# Patient Record
Sex: FEMALE | Race: OTHER | HISPANIC OR LATINO | Employment: UNEMPLOYED | URBAN - METROPOLITAN AREA
[De-identification: names, ages, dates, MRNs, and addresses within clinical notes are randomized per-mention and may not be internally consistent; named-entity substitution may affect disease eponyms.]

---

## 2020-11-10 ENCOUNTER — OFFICE VISIT (OUTPATIENT)
Dept: UROLOGY | Facility: CLINIC | Age: 45
End: 2020-11-10
Payer: COMMERCIAL

## 2020-11-10 VITALS — WEIGHT: 160.25 LBS | SYSTOLIC BLOOD PRESSURE: 180 MMHG | TEMPERATURE: 97 F | DIASTOLIC BLOOD PRESSURE: 90 MMHG

## 2020-11-10 DIAGNOSIS — R35.0 URINARY FREQUENCY: Primary | ICD-10-CM

## 2020-11-10 DIAGNOSIS — R33.9 URINARY RETENTION: ICD-10-CM

## 2020-11-10 DIAGNOSIS — N81.11 CYSTOCELE, MIDLINE: ICD-10-CM

## 2020-11-10 LAB
BILIRUB SERPL-MCNC: ABNORMAL MG/DL
BLOOD URINE, POC: 250
CLARITY, POC UA: CLEAR
COLOR, POC UA: YELLOW
GLUCOSE UR QL STRIP: ABNORMAL
KETONES UR QL STRIP: ABNORMAL
LEUKOCYTE ESTERASE URINE, POC: ABNORMAL
NITRITE, POC UA: ABNORMAL
PH, POC UA: 5
POC RESIDUAL URINE VOLUME: 151 ML (ref 0–100)
PROTEIN, POC: ABNORMAL
SPECIFIC GRAVITY, POC UA: 1.02
UROBILINOGEN, POC UA: ABNORMAL

## 2020-11-10 PROCEDURE — 1126F PR PAIN SEVERITY QUANTIFIED, NO PAIN PRESENT: ICD-10-PCS | Mod: S$GLB,,, | Performed by: UROLOGY

## 2020-11-10 PROCEDURE — 81002 URINALYSIS NONAUTO W/O SCOPE: CPT | Mod: S$GLB,,, | Performed by: UROLOGY

## 2020-11-10 PROCEDURE — 51798 US URINE CAPACITY MEASURE: CPT | Mod: S$GLB,,, | Performed by: UROLOGY

## 2020-11-10 PROCEDURE — 99204 OFFICE O/P NEW MOD 45 MIN: CPT | Mod: 25,S$GLB,, | Performed by: UROLOGY

## 2020-11-10 PROCEDURE — 99204 PR OFFICE/OUTPT VISIT, NEW, LEVL IV, 45-59 MIN: ICD-10-PCS | Mod: 25,S$GLB,, | Performed by: UROLOGY

## 2020-11-10 PROCEDURE — 51701 INSERT BLADDER CATHETER: CPT | Mod: S$GLB,,, | Performed by: UROLOGY

## 2020-11-10 PROCEDURE — 1126F AMNT PAIN NOTED NONE PRSNT: CPT | Mod: S$GLB,,, | Performed by: UROLOGY

## 2020-11-10 PROCEDURE — 51798 POCT BLADDER SCAN: ICD-10-PCS | Mod: S$GLB,,, | Performed by: UROLOGY

## 2020-11-10 PROCEDURE — 51701 PR INSERTION OF NON-INDWELLING BLADDER CATHETERIZATION FOR RESIDUAL UR: ICD-10-PCS | Mod: S$GLB,,, | Performed by: UROLOGY

## 2020-11-10 PROCEDURE — 81002 POCT URINE DIPSTICK WITHOUT MICROSCOPE: ICD-10-PCS | Mod: S$GLB,,, | Performed by: UROLOGY

## 2020-11-10 RX ORDER — TAMSULOSIN HYDROCHLORIDE 0.4 MG/1
0.4 CAPSULE ORAL DAILY
Qty: 30 CAPSULE | Refills: 0 | Status: SHIPPED | OUTPATIENT
Start: 2020-11-10 | End: 2021-01-05 | Stop reason: SDUPTHER

## 2020-11-10 RX ORDER — HYDROCHLOROTHIAZIDE 25 MG/1
25 TABLET ORAL DAILY
COMMUNITY
Start: 2020-08-26

## 2020-11-10 RX ORDER — PREDNISONE 20 MG/1
20 TABLET ORAL DAILY
COMMUNITY
Start: 2020-09-01 | End: 2020-11-10

## 2020-11-10 NOTE — PROGRESS NOTES
"  Chief Complaint   Patient presents with    Urinary Frequency       Referring Provider: Aaareferral Self      History of Present Illness:   Justa Garcia is a 45 y.o. female here for evaluation of Urinary Frequency    She reports that she "lives" in the bathroom. She has frequency. Sometimes there is dysuria. Sometimes she feels like she really needs to urinate, but only a little will come out. Sometimes she presses on her left side. She went to a clinic in [place] and she was treated with antibiotics. She went back after a week and was treated with more antibiotics.   Symptoms have been ongoing for 3 months.   She reports that her menstrual cycle has been irregular for the past year and she may be going through menopause.   She has been taking OTC cranberry supplement.   She had a recent pap smear at [place] clinic recently.   She reports minor constipation.   She reports that she tends to have white coat hypertension.         Review of Systems   Constitutional: Negative for appetite change, chills, fever and unexpected weight change.   HENT: Negative for drooling, ear pain, facial swelling, mouth sores and nosebleeds.    Eyes: Negative for photophobia, pain and visual disturbance.   Respiratory: Negative for apnea, choking and chest tightness.    Cardiovascular: Negative for chest pain, palpitations and leg swelling.   Gastrointestinal: Negative for anal bleeding, constipation, diarrhea, nausea and vomiting.   Genitourinary: Positive for frequency and vaginal bleeding. Negative for genital sores and vaginal discharge.   Musculoskeletal: Negative for gait problem, joint swelling and myalgias.   Skin: Negative for color change, rash and wound.   Neurological: Negative for dizziness, tremors, seizures and syncope.   Hematological: Negative for adenopathy. Does not bruise/bleed easily.   Psychiatric/Behavioral: Negative for confusion, hallucinations and self-injury.         Past Medical History: "   Diagnosis Date    HTN (hypertension)        Past Surgical History:   Procedure Laterality Date     SECTION         Family History   Problem Relation Age of Onset    Hypertension Father        Social History     Tobacco Use    Smoking status: Never Smoker    Smokeless tobacco: Never Used   Substance Use Topics    Alcohol use: Never     Frequency: Never    Drug use: Never       Current Outpatient Medications   Medication Sig Dispense Refill    hydroCHLOROthiazide (HYDRODIURIL) 25 MG tablet Take 25 mg by mouth once daily.      tamsulosin (FLOMAX) 0.4 mg Cap Take 1 capsule (0.4 mg total) by mouth once daily. 30 capsule 0     No current facility-administered medications for this visit.        Review of patient's allergies indicates:  No Known Allergies    Physical Exam  Vitals:    11/10/20 1603   BP: (!) 180/90   Temp: 97.2 °F (36.2 °C)     General: Well-developed, well-nourished, in no acute distress  HEENT: Normocephalic, atraumatic, extraocular movements intact  Neck: Supple, no supraclavicular or cervical lymphadenopathy, trachea midline  Respirations: even and unlabored  Back: midline spine, No CVA tenderness  Abdomen: soft, Non-tender, non-distended, no palpable masses, no rebound or guarding  : Normal external female genitalia without lesions. Orthotopic urethral meatus. Grade 2 Cysotcele, Grade 1 Rectocele, Grade 1 apical prolapse, negative cough stress test, negative urethral hypermobility, in and out catheterization performed under sterile conditions and drained 200cc from the bladder (post void)  Extremities: moves all equally, no clubbing, cyanosis or edema  Skin: Warm and dry. No lesions  Psych: normal affect  Neuro: Alert and oriented x 3. Cranial nerves II-XII intact      Urinalysis  pH, UA   Date Value Ref Range Status   11/10/2020 5  Final     Nitrite, UA   Date Value Ref Range Status   11/10/2020 n  Final           Assessment:  1. Urinary frequency  POCT URINE DIPSTICK WITHOUT  MICROSCOPE    POCT Bladder Scan   2. Urinary retention     3. Cystocele, midline           Plan:   Urinary frequency  -     POCT URINE DIPSTICK WITHOUT MICROSCOPE  -     POCT Bladder Scan    Urinary retention    Cystocele, midline    Other orders  -     tamsulosin (FLOMAX) 0.4 mg Cap; Take 1 capsule (0.4 mg total) by mouth once daily.  Dispense: 30 capsule; Refill: 0    Discussed possible causes of retention with the patient and her . Will try flomax, but if not improved, will get urodynamics.       Follow up in about 2 weeks (around 11/24/2020).

## 2020-11-20 ENCOUNTER — TELEPHONE (OUTPATIENT)
Dept: UROLOGY | Facility: CLINIC | Age: 45
End: 2020-11-20

## 2020-11-20 NOTE — TELEPHONE ENCOUNTER
Called and spoke with .  States his wife saw Dr Mesa last week and was not emptying her bladder like she should. She was started on some medication to help her void. He states that when she woke up this morning she was not able to void.  He said that she finally voided a little.  He was wanting to see if she could come in to see Dr Mesa this afternoon.  I explained that Dr Mesa was in surgery today and that I could schedule her to see Dr Hall this afternoon.  He states she is shopping right now and that they would just wait until their appointment for Tuesday.  I told him if it go to where she was having a lot of pain or couldn't void to make sure she goes to the ER.  He voices understanding

## 2020-11-20 NOTE — TELEPHONE ENCOUNTER
----- Message from Leandra Greer sent at 11/20/2020  9:16 AM CST -----  Regarding: Same Day Apppt Request  Same Day Appt Request:    Pt's  Mr Kilo Garcia called to request a work in appt today for the pt due to the pt being unable to urinate and would like a call back this morning asa.    Mr Garcia can be reached at [phone]

## 2020-11-24 ENCOUNTER — OFFICE VISIT (OUTPATIENT)
Dept: UROLOGY | Facility: CLINIC | Age: 45
End: 2020-11-24
Payer: COMMERCIAL

## 2020-11-24 VITALS — DIASTOLIC BLOOD PRESSURE: 90 MMHG | TEMPERATURE: 98 F | WEIGHT: 162.06 LBS | SYSTOLIC BLOOD PRESSURE: 176 MMHG

## 2020-11-24 DIAGNOSIS — R33.9 URINARY RETENTION: ICD-10-CM

## 2020-11-24 DIAGNOSIS — R10.32 LLQ ABDOMINAL PAIN: Primary | ICD-10-CM

## 2020-11-24 DIAGNOSIS — R35.0 URINARY FREQUENCY: ICD-10-CM

## 2020-11-24 LAB — POC RESIDUAL URINE VOLUME: 71 ML (ref 0–100)

## 2020-11-24 PROCEDURE — 99999 PR PBB SHADOW E&M-EST. PATIENT-LVL III: CPT | Mod: PBBFAC,,, | Performed by: UROLOGY

## 2020-11-24 PROCEDURE — 1126F PR PAIN SEVERITY QUANTIFIED, NO PAIN PRESENT: ICD-10-PCS | Mod: S$GLB,,, | Performed by: UROLOGY

## 2020-11-24 PROCEDURE — 51798 US URINE CAPACITY MEASURE: CPT | Mod: S$GLB,,, | Performed by: UROLOGY

## 2020-11-24 PROCEDURE — 99214 OFFICE O/P EST MOD 30 MIN: CPT | Mod: S$GLB,,, | Performed by: UROLOGY

## 2020-11-24 PROCEDURE — 1126F AMNT PAIN NOTED NONE PRSNT: CPT | Mod: S$GLB,,, | Performed by: UROLOGY

## 2020-11-24 PROCEDURE — 99214 PR OFFICE/OUTPT VISIT, EST, LEVL IV, 30-39 MIN: ICD-10-PCS | Mod: S$GLB,,, | Performed by: UROLOGY

## 2020-11-24 PROCEDURE — 51798 POCT BLADDER SCAN: ICD-10-PCS | Mod: S$GLB,,, | Performed by: UROLOGY

## 2020-11-24 PROCEDURE — 99999 PR PBB SHADOW E&M-EST. PATIENT-LVL III: ICD-10-PCS | Mod: PBBFAC,,, | Performed by: UROLOGY

## 2020-11-24 NOTE — PROGRESS NOTES
Chief Complaint   Patient presents with    Follow-up     pt states still the same with urgency, burning after expelling and states some L abdominal pressure         History of Present Illness:   Justa Garcia is a 45 y.o. female here for follow up. At her last visit, she was found to have urinary retention and was started on flomax. Since that time, she reports that she is minimally improved.  She reports that she feels pain/pressure in her LLQ and suprapubic region. She is having a little EBONI as well at times. Sometimes it eases off, sometimes worsens. Nighttime is a little better since starting the flomax.       Review of Systems:   Constitutional: Pt denies fever, chills, change in appetite or unintentional weight loss  HENT: No drooling, loss of hearing, mouth sores, facial swelling  Eyes: No photophobia, visual disturbance or eye pain  Respiratory: No cough, dyspnea, wheezing  Cardiovascular: No chest pain, palpitations or leg swelling  GI: No constipation, diarrhea, nausea, vomiting, melena or hematochezia  : No genital lesions, discharge, nocturnal enuresis  Endocrine: No polydipsia, polyphagia, heat or cold intolerance  Musculoskeletal: No joint swelling, back pain or bone pain  Integument: No color change, rash or wounds  Neurological: No seizures, speech difficulty or tremors  Psychiatric: No confusion, self-harm or Hallucinations    Current Outpatient Medications   Medication Sig Dispense Refill    hydroCHLOROthiazide (HYDRODIURIL) 25 MG tablet Take 25 mg by mouth once daily.      tamsulosin (FLOMAX) 0.4 mg Cap Take 1 capsule (0.4 mg total) by mouth once daily. 30 capsule 0     No current facility-administered medications for this visit.        Review of patient's allergies indicates:  No Known Allergies    Past medical, family, and social history reviewed as documented in chart with pertinent positive medical, family, and social history detailed in HPI.    Physical Exam  Vitals:    11/24/20 1635    BP: (!) 176/90   Temp: 98 °F (36.7 °C)       General: Well-developed, Well Nourished in No acute distress  HEENT: Normocephalic, Atraumatic, Extraocular Movements intact  Neck: Supple, trachea midline, no cervical or supraclavicular lymphadenopathy  Respirations: Even and unlabored  Back: Midline spine without tenderness, no CVA tenderness  Abdomen: Soft, tender in LLQ, non-distended, no hepatosplenomegaly, no rebound or guarding, no palpable masses  Extremities: Moves all equally, atraumatic, no clubbing, cyanosis or edema  Skin: warm and dry  Psych: normal affect  Neuro: Alert and oriented x 3, Cranial nerves II-XII grossly intact      Urinalysis  Color, UA   Date Value Ref Range Status   11/10/2020 Yellow  Final     pH, UA   Date Value Ref Range Status   11/10/2020 5  Final     Spec Grav UA   Date Value Ref Range Status   11/10/2020 1.020  Final     Nitrite, UA   Date Value Ref Range Status   11/10/2020 n  Final     PVR: 71cc    Assesment:   1. LLQ abdominal pain  CT Abdomen Pelvis With Contrast    BUN    Creatinine, Serum   2. Urinary retention  POCT Bladder Scan   3. Urinary frequency           Plan:  LLQ abdominal pain  -     CT Abdomen Pelvis With Contrast; Future; Expected date: 11/24/2020  -     BUN; Future; Expected date: 11/24/2020  -     Creatinine, Serum; Future; Expected date: 11/24/2020    Urinary retention  -     POCT Bladder Scan    Urinary frequency    continue flomax    Follow up for Urodynamics.

## 2020-11-25 ENCOUNTER — TELEPHONE (OUTPATIENT)
Dept: UROLOGY | Facility: CLINIC | Age: 45
End: 2020-11-25

## 2020-11-25 NOTE — TELEPHONE ENCOUNTER
Called and spoke with .  I informed him of this wife's date and time of the procedure.  Voices understanding and will let his wife know

## 2020-12-01 ENCOUNTER — TELEPHONE (OUTPATIENT)
Dept: RADIOLOGY | Facility: HOSPITAL | Age: 45
End: 2020-12-01

## 2020-12-01 ENCOUNTER — TELEPHONE (OUTPATIENT)
Dept: UROLOGY | Facility: CLINIC | Age: 45
End: 2020-12-01

## 2020-12-01 NOTE — TELEPHONE ENCOUNTER
Yes, it's medically necessary. Her condition could definitely worsen if her pain is not properly evaluated and she could develop irreversible damage.

## 2020-12-02 ENCOUNTER — HOSPITAL ENCOUNTER (OUTPATIENT)
Dept: RADIOLOGY | Facility: HOSPITAL | Age: 45
Discharge: HOME | End: 2020-12-02
Attending: UROLOGY
Payer: COMMERCIAL

## 2020-12-02 ENCOUNTER — OFFICE VISIT (OUTPATIENT)
Dept: UROLOGY | Facility: CLINIC | Age: 45
End: 2020-12-02
Payer: COMMERCIAL

## 2020-12-02 VITALS
BODY MASS INDEX: 26.05 KG/M2 | HEART RATE: 105 BPM | DIASTOLIC BLOOD PRESSURE: 86 MMHG | HEIGHT: 66 IN | SYSTOLIC BLOOD PRESSURE: 148 MMHG | WEIGHT: 162.06 LBS

## 2020-12-02 DIAGNOSIS — N39.3 SUI (STRESS URINARY INCONTINENCE, FEMALE): ICD-10-CM

## 2020-12-02 DIAGNOSIS — K76.9 LIVER DISEASE, UNSPECIFIED: ICD-10-CM

## 2020-12-02 DIAGNOSIS — R10.32 LLQ ABDOMINAL PAIN: ICD-10-CM

## 2020-12-02 DIAGNOSIS — R33.9 URINARY RETENTION: Primary | ICD-10-CM

## 2020-12-02 DIAGNOSIS — R93.5 ABNORMAL CT SCAN, PELVIS: ICD-10-CM

## 2020-12-02 DIAGNOSIS — N32.81 DETRUSOR INSTABILITY: ICD-10-CM

## 2020-12-02 DIAGNOSIS — K76.89 LIVER CYST: ICD-10-CM

## 2020-12-02 PROCEDURE — 51797 INTRAABDOMINAL PRESSURE TEST: CPT | Mod: 26,S$GLB,, | Performed by: UROLOGY

## 2020-12-02 PROCEDURE — 3008F PR BODY MASS INDEX (BMI) DOCUMENTED: ICD-10-PCS | Mod: CPTII,S$GLB,, | Performed by: UROLOGY

## 2020-12-02 PROCEDURE — 99499 NO LOS: ICD-10-PCS | Mod: S$GLB,,, | Performed by: UROLOGY

## 2020-12-02 PROCEDURE — 99999 PR PBB SHADOW E&M-EST. PATIENT-LVL IV: ICD-10-PCS | Mod: PBBFAC,,, | Performed by: UROLOGY

## 2020-12-02 PROCEDURE — 81002 URINALYSIS NONAUTO W/O SCOPE: CPT | Mod: S$GLB,,, | Performed by: UROLOGY

## 2020-12-02 PROCEDURE — 74177 CT ABDOMEN PELVIS WITH CONTRAST: ICD-10-PCS | Mod: 26,,, | Performed by: RADIOLOGY

## 2020-12-02 PROCEDURE — 51728 PR COMPLEX CYSTOMETROGRAM VOIDING PRESSURE STUDIES: ICD-10-PCS | Mod: 26,S$GLB,, | Performed by: UROLOGY

## 2020-12-02 PROCEDURE — 81002 POCT URINE DIPSTICK WITHOUT MICROSCOPE: ICD-10-PCS | Mod: S$GLB,,, | Performed by: UROLOGY

## 2020-12-02 PROCEDURE — 51728 CYSTOMETROGRAM W/VP: CPT | Mod: 26,S$GLB,, | Performed by: UROLOGY

## 2020-12-02 PROCEDURE — 74177 CT ABD & PELVIS W/CONTRAST: CPT | Mod: 26,,, | Performed by: RADIOLOGY

## 2020-12-02 PROCEDURE — 74177 CT ABD & PELVIS W/CONTRAST: CPT | Mod: TC

## 2020-12-02 PROCEDURE — 51784 PR ANAL/URINARY MUSCLE STUDY: ICD-10-PCS | Mod: 26,51,S$GLB, | Performed by: UROLOGY

## 2020-12-02 PROCEDURE — 51797 PR VOIDING PRESS STUDY INTRA-ABDOMINAL VOID: ICD-10-PCS | Mod: 26,S$GLB,, | Performed by: UROLOGY

## 2020-12-02 PROCEDURE — 99999 PR PBB SHADOW E&M-EST. PATIENT-LVL IV: CPT | Mod: PBBFAC,,, | Performed by: UROLOGY

## 2020-12-02 PROCEDURE — 3008F BODY MASS INDEX DOCD: CPT | Mod: CPTII,S$GLB,, | Performed by: UROLOGY

## 2020-12-02 PROCEDURE — 1126F PR PAIN SEVERITY QUANTIFIED, NO PAIN PRESENT: ICD-10-PCS | Mod: S$GLB,,, | Performed by: UROLOGY

## 2020-12-02 PROCEDURE — 25500020 PHARM REV CODE 255: Performed by: UROLOGY

## 2020-12-02 PROCEDURE — 51784 ANAL/URINARY MUSCLE STUDY: CPT | Mod: 26,51,S$GLB, | Performed by: UROLOGY

## 2020-12-02 PROCEDURE — A9698 NON-RAD CONTRAST MATERIALNOC: HCPCS | Performed by: UROLOGY

## 2020-12-02 PROCEDURE — 99499 UNLISTED E&M SERVICE: CPT | Mod: S$GLB,,, | Performed by: UROLOGY

## 2020-12-02 PROCEDURE — 51741 PR UROFLOWMETRY, COMPLEX: ICD-10-PCS | Mod: 26,51,S$GLB, | Performed by: UROLOGY

## 2020-12-02 PROCEDURE — 1126F AMNT PAIN NOTED NONE PRSNT: CPT | Mod: S$GLB,,, | Performed by: UROLOGY

## 2020-12-02 PROCEDURE — 51741 ELECTRO-UROFLOWMETRY FIRST: CPT | Mod: 26,51,S$GLB, | Performed by: UROLOGY

## 2020-12-02 RX ADMIN — IOHEXOL 75 ML: 350 INJECTION, SOLUTION INTRAVENOUS at 08:12

## 2020-12-02 RX ADMIN — IOHEXOL 1000 ML: 12 SOLUTION ORAL at 07:12

## 2020-12-02 NOTE — PROGRESS NOTES
Urodynamic Studies    Procedure in detail:   Informed consent was obtained. The patient urinated into the uroflow, which revealed a Qmax of 8mL/sec and Qavg of 5mL. The patient voided 91mL. Urinalysis appeared uninfected. The genitalia was prepped in the usual sterile fashion. In and out catheterization yielded a post-void residual of 40mL. Urethral and vaginal catheters were placed. EMG leads were placed. The catheters were charged and zeroed.     Filling Phase  Rate of fillinml/min  First sensation: 14mL  Normal Desire: 45mL  Strong Desire: 69mL  Urgency: identified several low level detrusor contractions, up to 9cmH20 with urgency reported. Filling stopped each time and urgency improved.   Capacity: 200mL  Valsalva leak point pressure: 64cm H2O  Detrusor leak point pressure: N/a    Stress testing was performed at 134cc with both cough and valsalva. There was incontinence during valsalva.     Voiding Phase  Volume voided: 229cc  Qmax: 12cc  Qavcc  Pdet Max: 29cm/H2O  PVR: 0mL  EMG: synergic    A/P:   1. Stress incontinence  2. Unstable detrusor contractions  3. Urinary retention  3. Gallstones on CT scan  4. Liver hypodensity on CT scan  5. Prominent Uterus with hypodensities    Pt to continue flomax for now. No cause identified on urodynamics for urinary retention. Would like pt to undergo gyn evaluation, given appearance of uterus on CT scan and irregular menstrual cycle.   Will also have patient establish with primary care  MRI of the liver to further evaluate hypodensity  Discussed referral to gen surg for cholelithiasis. Pt would like to have other referrals first.   F/u 4 weeks.

## 2020-12-03 ENCOUNTER — OFFICE VISIT (OUTPATIENT)
Dept: OBSTETRICS AND GYNECOLOGY | Facility: CLINIC | Age: 45
End: 2020-12-03
Payer: COMMERCIAL

## 2020-12-03 VITALS
DIASTOLIC BLOOD PRESSURE: 84 MMHG | SYSTOLIC BLOOD PRESSURE: 138 MMHG | HEIGHT: 66 IN | BODY MASS INDEX: 25.94 KG/M2 | WEIGHT: 161.38 LBS

## 2020-12-03 DIAGNOSIS — N85.2 UTERINE ENLARGEMENT: Primary | ICD-10-CM

## 2020-12-03 DIAGNOSIS — R93.5 ABNORMAL CT SCAN, PELVIS: ICD-10-CM

## 2020-12-03 LAB
BILIRUB SERPL-MCNC: NORMAL MG/DL
BLOOD URINE, POC: NORMAL
CLARITY, POC UA: NORMAL
COLOR, POC UA: YELLOW
GLUCOSE UR QL STRIP: NORMAL
KETONES UR QL STRIP: NORMAL
LEUKOCYTE ESTERASE URINE, POC: NORMAL
NITRITE, POC UA: NORMAL
PH, POC UA: 7
PROTEIN, POC: NORMAL
SPECIFIC GRAVITY, POC UA: 1
UROBILINOGEN, POC UA: NORMAL

## 2020-12-03 PROCEDURE — 1126F PR PAIN SEVERITY QUANTIFIED, NO PAIN PRESENT: ICD-10-PCS | Mod: S$GLB,,, | Performed by: OBSTETRICS & GYNECOLOGY

## 2020-12-03 PROCEDURE — 3008F BODY MASS INDEX DOCD: CPT | Mod: CPTII,S$GLB,, | Performed by: OBSTETRICS & GYNECOLOGY

## 2020-12-03 PROCEDURE — 3008F PR BODY MASS INDEX (BMI) DOCUMENTED: ICD-10-PCS | Mod: CPTII,S$GLB,, | Performed by: OBSTETRICS & GYNECOLOGY

## 2020-12-03 PROCEDURE — 99244 OFF/OP CNSLTJ NEW/EST MOD 40: CPT | Mod: S$GLB,,, | Performed by: OBSTETRICS & GYNECOLOGY

## 2020-12-03 PROCEDURE — 99999 PR PBB SHADOW E&M-EST. PATIENT-LVL III: CPT | Mod: PBBFAC,,, | Performed by: OBSTETRICS & GYNECOLOGY

## 2020-12-03 PROCEDURE — 99999 PR PBB SHADOW E&M-EST. PATIENT-LVL III: ICD-10-PCS | Mod: PBBFAC,,, | Performed by: OBSTETRICS & GYNECOLOGY

## 2020-12-03 PROCEDURE — 1126F AMNT PAIN NOTED NONE PRSNT: CPT | Mod: S$GLB,,, | Performed by: OBSTETRICS & GYNECOLOGY

## 2020-12-03 PROCEDURE — 99244 PR OFFICE CONSULTATION,LEVEL IV: ICD-10-PCS | Mod: S$GLB,,, | Performed by: OBSTETRICS & GYNECOLOGY

## 2020-12-03 NOTE — LETTER
December 4, 2020      Richelle Mesa MD  [address]  [address]  [facility]  [place] [address]           [facility]  [address]  [place] [address]  Phone: [phone]          Patient: Justa Garcia   MR Number: [id number]   YOB: 1975   Date of Visit: 12/3/2020       Dear Dr. Richelle Mesa:    Thank you for referring Justa Garcia to me for evaluation. Attached you will find relevant portions of my assessment and plan of care.    If you have questions, please do not hesitate to call me. I look forward to following Justa Garcia along with you.    Sincerely,    Brii Fernandes MD    Enclosure  CC:  No Recipients    If you would like to receive this communication electronically, please contact [email or web address] or [phone] to request more information on [employer] Link access.    For providers and/or their staff who would like to refer a patient to [facility], please contact us through our one-stop-shop provider referral line, [facility], at [phone].    If you feel you have received this communication in error or would no longer like to receive these types of communications, please e-mail [email or web address]

## 2020-12-03 NOTE — PROGRESS NOTES
"CC: Well woman exam    Justa Garcia is a 45 y.o. female  referred from urology for evaluation of "prominent" uterus noted on CT.  LMP: Patient's last menstrual period was 2020 (approximate).. Pt has been having regular cycles but only spotting for past 4months. Seeing urology for urinary frequency/urgency, denies incontinence/abdominal distension/pelvic pain.  *gyn & mmg done outside clinics-records requested    Past Medical History:   Diagnosis Date    HTN (hypertension)      Past Surgical History:   Procedure Laterality Date     SECTION       Social History     Socioeconomic History    Marital status:      Spouse name: Not on file    Number of children: Not on file    Years of education: Not on file    Highest education level: Not on file   Occupational History    Not on file   Social Needs    Financial resource strain: Not on file    Food insecurity     Worry: Not on file     Inability: Not on file    Transportation needs     Medical: Not on file     Non-medical: Not on file   Tobacco Use    Smoking status: Never Smoker    Smokeless tobacco: Never Used   Substance and Sexual Activity    Alcohol use: Never     Frequency: Never    Drug use: Never    Sexual activity: Yes     Partners: Male     Birth control/protection: None   Lifestyle    Physical activity     Days per week: Not on file     Minutes per session: Not on file    Stress: Not on file   Relationships    Social connections     Talks on phone: Not on file     Gets together: Not on file     Attends Religion service: Not on file     Active member of club or organization: Not on file     Attends meetings of clubs or organizations: Not on file     Relationship status: Not on file   Other Topics Concern    Not on file   Social History Narrative    Not on file     Family History   Problem Relation Age of Onset    Hypertension Father     Breast cancer Neg Hx     Colon cancer Neg Hx     Ovarian cancer Neg Hx " "     OB History        3    Para   2    Term   2            AB   1    Living   2       SAB        TAB        Ectopic   1    Multiple        Live Births   2                 Current Outpatient Medications:     hydroCHLOROthiazide (HYDRODIURIL) 25 MG tablet, Take 25 mg by mouth once daily., Disp: , Rfl:     tamsulosin (FLOMAX) 0.4 mg Cap, Take 1 capsule (0.4 mg total) by mouth once daily., Disp: 30 capsule, Rfl: 0    GYNECOLOGY HISTORY:  No abnormal pap/std    DATA REVIEWED:  Last pap:  Results: normal per pt  mmg normal per pt 2020    /84   Ht 5' 6" (1.676 m)   Wt 73.2 kg (161 lb 6 oz)   LMP 2020 (Approximate)   BMI 26.05 kg/m²     ROS:  GENERAL: Denies weight gain or weight loss. Feeling well overall.   SKIN: Denies rash or lesions.   HEAD: Denies head injury or headache.   NODES: Denies enlarged lymph nodes.   CHEST: Denies chest pain or shortness of breath.   CARDIOVASCULAR: Denies palpitations or left sided chest pain.   ABDOMEN: No abdominal pain, constipation, diarrhea, nausea, vomiting or rectal bleeding.   URINARY: No frequency, dysuria, hematuria, or burning on urination.  REPRODUCTIVE: See HPI.   BREASTS: The patient denies pain, lumps, or nipple discharge.   HEMATOLOGIC: No easy bruisability or excessive bleeding.   MUSCULOSKELETAL: Denies joint pain or swelling.   NEUROLOGIC: Denies syncope or weakness.   PSYCHIATRIC: Denies depression, anxiety or mood swings.    PHYSICAL EXAM:    APPEARANCE: Well nourished, well developed, in no acute distress.  AFFECT: WNL, alert and oriented x 3  NODES: No inguinal, cervical, axillary, or femoral lymph node enlargement  CHEST: Good respiratory effect  ABDOMEN: Soft.  No tenderness or masses.  No hepatosplenomegaly.  No hernias.  PELVIC: Normal external genitalia without lesions.  Normal hair distribution.  Adequate perineal body, normal urethral meatus.  Vagina moist and well rugated without lesions or discharge.  Cervix pink, without " lesions, discharge or tenderness.  No significant cystocele or rectocele.  Bimanual exam shows uterus to be 12 week size, slightly irregular, mobile and nontender.  Adnexa without masses or tenderness.    EXTREMITIES: No edema.    Uterine enlargement  -     US Pelvis Comp with Transvag NON-OB (xpd; Future; Expected date: 12/03/2020    Abnormal CT scan, pelvis  -     Ambulatory referral/consult to Obstetrics / Gynecology  -     US Pelvis Comp with Transvag NON-OB (xpd; Future; Expected date: 12/03/2020    CT reviewed w/ pt. Will obtain further information w pelvic u/s but no significant fibroids noted on CT that could be cause of urinary urgency.

## 2020-12-04 ENCOUNTER — HOSPITAL ENCOUNTER (OUTPATIENT)
Dept: RADIOLOGY | Facility: HOSPITAL | Age: 45
Discharge: HOME | End: 2020-12-04
Attending: OBSTETRICS & GYNECOLOGY
Payer: COMMERCIAL

## 2020-12-04 DIAGNOSIS — N85.2 UTERINE ENLARGEMENT: ICD-10-CM

## 2020-12-04 DIAGNOSIS — R93.5 ABNORMAL CT SCAN, PELVIS: ICD-10-CM

## 2020-12-04 PROCEDURE — 76856 US EXAM PELVIC COMPLETE: CPT | Mod: 26,,, | Performed by: RADIOLOGY

## 2020-12-04 PROCEDURE — 76830 TRANSVAGINAL US NON-OB: CPT | Mod: 26,,, | Performed by: RADIOLOGY

## 2020-12-04 PROCEDURE — 76830 US PELVIS COMP WITH TRANSVAG NON-OB (XPD): ICD-10-PCS | Mod: 26,,, | Performed by: RADIOLOGY

## 2020-12-04 PROCEDURE — 76830 TRANSVAGINAL US NON-OB: CPT | Mod: TC

## 2020-12-04 PROCEDURE — 76856 US PELVIS COMP WITH TRANSVAG NON-OB (XPD): ICD-10-PCS | Mod: 26,,, | Performed by: RADIOLOGY

## 2020-12-07 ENCOUNTER — TELEPHONE (OUTPATIENT)
Dept: OBSTETRICS AND GYNECOLOGY | Facility: CLINIC | Age: 45
End: 2020-12-07

## 2020-12-28 ENCOUNTER — TELEPHONE (OUTPATIENT)
Dept: OBSTETRICS AND GYNECOLOGY | Facility: CLINIC | Age: 45
End: 2020-12-28

## 2020-12-28 NOTE — TELEPHONE ENCOUNTER
Spoke with patient's  Kilo. He requested the results of patient's pelvic u/s. Informed, per Dr. Fernandes that the results of the ultrasound were normal, and no additional imaging is needed at this time. Patient's  voiced understanding.

## 2020-12-28 NOTE — TELEPHONE ENCOUNTER
----- Message from Prasanna Arias sent at 12/28/2020 12:33 PM CST -----  Contact: Kilo  Type:  Test Results    Who Called: Kilo ()  Name of Test (Lab/Mammo/Etc): pritesh  Date of Test: 12/4  Ordering Provider: Dena  Where the test was performed: [facility]  Would the patient rather a call back or a response via [facility]? Call back  Best Call Back Number: [phone]  Additional Information:

## 2021-01-05 ENCOUNTER — OFFICE VISIT (OUTPATIENT)
Dept: UROLOGY | Facility: CLINIC | Age: 46
End: 2021-01-05
Payer: COMMERCIAL

## 2021-01-05 VITALS
HEIGHT: 66 IN | DIASTOLIC BLOOD PRESSURE: 84 MMHG | WEIGHT: 161.38 LBS | BODY MASS INDEX: 25.94 KG/M2 | TEMPERATURE: 97 F | SYSTOLIC BLOOD PRESSURE: 128 MMHG

## 2021-01-05 DIAGNOSIS — R33.9 URINARY RETENTION: Primary | ICD-10-CM

## 2021-01-05 DIAGNOSIS — N39.3 STRESS INCONTINENCE: ICD-10-CM

## 2021-01-05 PROCEDURE — 1126F PR PAIN SEVERITY QUANTIFIED, NO PAIN PRESENT: ICD-10-PCS | Mod: S$GLB,,, | Performed by: UROLOGY

## 2021-01-05 PROCEDURE — 99213 PR OFFICE/OUTPT VISIT, EST, LEVL III, 20-29 MIN: ICD-10-PCS | Mod: S$GLB,,, | Performed by: UROLOGY

## 2021-01-05 PROCEDURE — 51798 PR MEAS,POST-VOID RES,US,NON-IMAGING: ICD-10-PCS | Mod: S$GLB,,, | Performed by: UROLOGY

## 2021-01-05 PROCEDURE — 99999 PR PBB SHADOW E&M-EST. PATIENT-LVL III: ICD-10-PCS | Mod: PBBFAC,,, | Performed by: UROLOGY

## 2021-01-05 PROCEDURE — 51798 US URINE CAPACITY MEASURE: CPT | Mod: S$GLB,,, | Performed by: UROLOGY

## 2021-01-05 PROCEDURE — 3008F PR BODY MASS INDEX (BMI) DOCUMENTED: ICD-10-PCS | Mod: CPTII,S$GLB,, | Performed by: UROLOGY

## 2021-01-05 PROCEDURE — 3008F BODY MASS INDEX DOCD: CPT | Mod: CPTII,S$GLB,, | Performed by: UROLOGY

## 2021-01-05 PROCEDURE — 99213 OFFICE O/P EST LOW 20 MIN: CPT | Mod: S$GLB,,, | Performed by: UROLOGY

## 2021-01-05 PROCEDURE — 1126F AMNT PAIN NOTED NONE PRSNT: CPT | Mod: S$GLB,,, | Performed by: UROLOGY

## 2021-01-05 PROCEDURE — 99999 PR PBB SHADOW E&M-EST. PATIENT-LVL III: CPT | Mod: PBBFAC,,, | Performed by: UROLOGY

## 2021-01-05 RX ORDER — TAMSULOSIN HYDROCHLORIDE 0.4 MG/1
0.4 CAPSULE ORAL DAILY
Qty: 30 CAPSULE | Refills: 11 | Status: SHIPPED | OUTPATIENT
Start: 2021-01-05 | End: 2022-01-05